# Patient Record
Sex: FEMALE | Race: ASIAN | NOT HISPANIC OR LATINO | Employment: UNEMPLOYED | URBAN - METROPOLITAN AREA
[De-identification: names, ages, dates, MRNs, and addresses within clinical notes are randomized per-mention and may not be internally consistent; named-entity substitution may affect disease eponyms.]

---

## 2024-05-10 ENCOUNTER — RESEARCH ENCOUNTER (OUTPATIENT)
Dept: RESEARCH | Facility: MEDICAL CENTER | Age: 49
End: 2024-05-10

## 2024-05-10 ENCOUNTER — OFFICE VISIT (OUTPATIENT)
Dept: MEDICAL GROUP | Facility: CLINIC | Age: 49
End: 2024-05-10
Payer: MEDICAID

## 2024-05-10 VITALS
DIASTOLIC BLOOD PRESSURE: 78 MMHG | TEMPERATURE: 98.1 F | BODY MASS INDEX: 25.34 KG/M2 | OXYGEN SATURATION: 96 % | SYSTOLIC BLOOD PRESSURE: 114 MMHG | HEIGHT: 63 IN | HEART RATE: 77 BPM | WEIGHT: 143 LBS

## 2024-05-10 DIAGNOSIS — F32.A ANXIETY AND DEPRESSION: ICD-10-CM

## 2024-05-10 DIAGNOSIS — Z13.9 DUE FOR SCREENING: ICD-10-CM

## 2024-05-10 DIAGNOSIS — Z91.89 AT RISK FOR BREAST CANCER: ICD-10-CM

## 2024-05-10 DIAGNOSIS — R53.82 CHRONIC FATIGUE AND MALAISE: ICD-10-CM

## 2024-05-10 DIAGNOSIS — R53.81 CHRONIC FATIGUE AND MALAISE: ICD-10-CM

## 2024-05-10 DIAGNOSIS — Z00.6 RESEARCH STUDY PATIENT: ICD-10-CM

## 2024-05-10 DIAGNOSIS — R63.5 WEIGHT GAIN: ICD-10-CM

## 2024-05-10 DIAGNOSIS — F10.91 ALCOHOL USE DISORDER IN REMISSION: ICD-10-CM

## 2024-05-10 DIAGNOSIS — Z78.9 ALCOHOL USE: ICD-10-CM

## 2024-05-10 DIAGNOSIS — J30.2 SEASONAL ALLERGIES: ICD-10-CM

## 2024-05-10 DIAGNOSIS — F41.9 ANXIETY AND DEPRESSION: ICD-10-CM

## 2024-05-10 PROCEDURE — 99203 OFFICE O/P NEW LOW 30 MIN: CPT | Mod: GE

## 2024-05-10 ASSESSMENT — PATIENT HEALTH QUESTIONNAIRE - PHQ9: CLINICAL INTERPRETATION OF PHQ2 SCORE: 3

## 2024-05-10 NOTE — PROGRESS NOTES
"Subjective:     CC:   Chief Complaint   Patient presents with    Establish Care     Pinched nerve behind ear that causes migraines, blood panel, feels weak. Lymph note swelling on neck, referral for mammogram,       HPI:   April Higuera is a 48 y.o. female who presents presents to the clinic today to establish care.  She recently moved to the state from [place].  Patient has a past medical history significant for breast cancer.  She also has a history of alcohol use disorder currently remission, seasonal allergies.  Patient presents to the clinic today with the following complaints, weight gain, fatigue malaise generalized anxiety and depression.  She states her symptoms have been ongoing now for several months to years.    Patient has a strong family history of colon cancer and breast cancer.  Does not appear as she still has ever been tested for any mutations to assess further.  She did have an oncologist in [place] however does not have an oncologist here.  She is due for her screening imaging studies.        Review of Systems   Constitutional: Positive for malaise fatigue and weight gain.  HENT: Negative for congestion.    Eyes: Negative for pain.   Respiratory: Negative for cough and shortness of breath.    Cardiovascular: Negative for chest pain and leg swelling.   Gastrointestinal: Negative for nausea, vomiting, abdominal pain and diarrhea.   Genitourinary: Negative for dysuria and hematuria.   Skin: Negative for rash.   Neurological: Negative for dizziness, focal weakness and headaches.   Endo/Heme/Allergies: Does not bruise/bleed easily.   Psychiatric/Behavioral: Positive for anxiety and depression.    Objective:   /78   Pulse 77   Temp 36.7 °C (98.1 °F) (Temporal)   Ht 1.6 m (5' 3\")   Wt 64.9 kg (143 lb)   SpO2 96%   BMI 25.33 kg/m²     Wt Readings from Last 4 Encounters:   05/10/24 64.9 kg (143 lb)       Physical Exam:  Constitutional: Well-developed and well-nourished. Not diaphoretic. No distress. "   Skin: Skin is warm and dry. No rash noted.  Head: Atraumatic without lesions.  Eyes: No scleral icterus.   Ears:  External ears unremarkable.   Nose: Nares patent. Septum midline. Turbinates without erythema nor edema. No discharge.   Mouth/Throat: Tongue normal. Oropharynx is clear and moist. Posterior pharynx without erythema or exudates.  Neck: Supple, trachea midline. Normal range of motion.   Cardiovascular: Regular rate and rhythm, S1 and S2 without murmur, rubs, or gallops.    Respiratory: Effort normal. Clear to auscultation throughout. No adventitious sounds.   Extremities: No cyanosis, clubbing, erythema, nor edema. Distal pulses intact and symmetric.   Musculoskeletal: All major joints AROM full in all directions without pain.  Neurological: Alert and oriented x 3. Grossly non-focal.   Psychiatric:  Behavior, mood, and affect are appropriate.    Assessment and Plan:     48-year-old female presenting to establish care with a significant past medical history of malignancy, not currently followed by oncology due to recent move out of state, presenting with significant anxiety depression chronic fatigue malaise and weight gain concerning for possible thyroid disorder versus psychiatric a daily allergy.  Patient screened for homicidal ideation which is negative.  Placed referral to psychology patient denies need for medication at this time.    Patient congratulated on her efforts to be in remission from alcohol use disorder.    1. At risk for breast cancer  - Referral to Genetic Research Studies  - Referral to Genetics  - BRCA1 and BRCA2 Sequencing; Future  - MA-DIAGNOSTIC MAMMO BILAT W/TOMOSYNTHESIS W/CAD; Future  - Referral to Hematology Oncology    2. Alcohol use    3. Seasonal allergies    4. Due for screening  - CBC WITH DIFFERENTIAL; Future  - Comp Metabolic Panel; Future  - Lipid Profile; Future  - HEMOGLOBIN A1C; Future  - HEP C VIRUS ANTIBODY; Future  - HIV AG/AB COMBO ASSAY SCREENING; Future    5.  Alcohol use disorder in remission    6. Chronic fatigue and malaise  - TSH WITH REFLEX TO FT4; Future    7. Weight gain  - TSH WITH REFLEX TO FT4; Future    8. Anxiety and depression  - Referral to Psychology      Health maintenance:  Due for screening lab, imaging    Labs per orders  Immunizations per orders  Patient counseled about skin care, diet, supplements, and exercise.  Discussed  breast self exam, mammography screening, STD prevention, feminine hygiene, family planning choices, osteoporosis, adequate intake of calcium and vitamin D, diet and exercise, colorectal cancer screening     Follow-up: 1 month

## 2024-05-10 NOTE — RESEARCH NOTE
Patient has been referred by Mica Diaz. Sent initial referral follow-up message with instructions to locate and sign consent form(s). The following consent form(s) have been pushed to the patient's MyChart: [facility]

## 2024-05-14 NOTE — RESEARCH NOTE
Confirmed with the participant which designated provider they would like study results shared with (Mica Diaz). Patient will have an opportunity to share the results with any providers of their choosing in the future by accessing their results from [employer].

## 2024-05-23 ENCOUNTER — HOSPITAL ENCOUNTER (OUTPATIENT)
Dept: LAB | Facility: MEDICAL CENTER | Age: 49
End: 2024-05-23
Payer: MEDICAID

## 2024-05-23 ENCOUNTER — HOSPITAL ENCOUNTER (OUTPATIENT)
Dept: LAB | Facility: MEDICAL CENTER | Age: 49
End: 2024-05-23

## 2024-05-23 DIAGNOSIS — R63.5 WEIGHT GAIN: ICD-10-CM

## 2024-05-23 DIAGNOSIS — Z00.6 RESEARCH STUDY PATIENT: ICD-10-CM

## 2024-05-23 DIAGNOSIS — R53.82 CHRONIC FATIGUE AND MALAISE: ICD-10-CM

## 2024-05-23 DIAGNOSIS — R53.81 CHRONIC FATIGUE AND MALAISE: ICD-10-CM

## 2024-05-23 DIAGNOSIS — Z91.89 AT RISK FOR BREAST CANCER: ICD-10-CM

## 2024-05-23 DIAGNOSIS — Z13.9 DUE FOR SCREENING: ICD-10-CM

## 2024-05-23 LAB
ALBUMIN SERPL BCP-MCNC: 4.3 G/DL (ref 3.2–4.9)
ALBUMIN/GLOB SERPL: 1.3 G/DL
ALP SERPL-CCNC: 55 U/L (ref 30–99)
ALT SERPL-CCNC: 36 U/L (ref 2–50)
ANION GAP SERPL CALC-SCNC: 14 MMOL/L (ref 7–16)
ANISOCYTOSIS BLD QL SMEAR: ABNORMAL
AST SERPL-CCNC: 38 U/L (ref 12–45)
BASOPHILS # BLD AUTO: 0.9 % (ref 0–1.8)
BASOPHILS # BLD: 0.05 K/UL (ref 0–0.12)
BILIRUB SERPL-MCNC: 0.5 MG/DL (ref 0.1–1.5)
BUN SERPL-MCNC: 11 MG/DL (ref 8–22)
CALCIUM ALBUM COR SERPL-MCNC: 9.8 MG/DL (ref 8.5–10.5)
CALCIUM SERPL-MCNC: 10 MG/DL (ref 8.5–10.5)
CHLORIDE SERPL-SCNC: 101 MMOL/L (ref 96–112)
CHOLEST SERPL-MCNC: 222 MG/DL (ref 100–199)
CO2 SERPL-SCNC: 22 MMOL/L (ref 20–33)
COMMENT NL1176: NORMAL
CREAT SERPL-MCNC: 0.54 MG/DL (ref 0.5–1.4)
EOSINOPHIL # BLD AUTO: 0.19 K/UL (ref 0–0.51)
EOSINOPHIL NFR BLD: 3.5 % (ref 0–6.9)
ERYTHROCYTE [DISTWIDTH] IN BLOOD BY AUTOMATED COUNT: 37.4 FL (ref 35.9–50)
EST. AVERAGE GLUCOSE BLD GHB EST-MCNC: 85 MG/DL
GFR SERPLBLD CREATININE-BSD FMLA CKD-EPI: 113 ML/MIN/1.73 M 2
GLOBULIN SER CALC-MCNC: 3.2 G/DL (ref 1.9–3.5)
GLUCOSE SERPL-MCNC: 94 MG/DL (ref 65–99)
HBA1C MFR BLD: 4.6 % (ref 4–5.6)
HCT VFR BLD AUTO: 38.7 % (ref 37–47)
HCV AB SER QL: NORMAL
HDLC SERPL-MCNC: 49 MG/DL
HGB BLD-MCNC: 12.9 G/DL (ref 12–16)
HIV 1+2 AB+HIV1 P24 AG SERPL QL IA: NORMAL
LDLC SERPL CALC-MCNC: 154 MG/DL
LYMPHOCYTES # BLD AUTO: 2.53 K/UL (ref 1–4.8)
LYMPHOCYTES NFR BLD: 46.9 % (ref 22–41)
MACROCYTES BLD QL SMEAR: ABNORMAL
MANUAL DIFF BLD: NORMAL
MCH RBC QN AUTO: 23.2 PG (ref 27–33)
MCHC RBC AUTO-ENTMCNC: 33.3 G/DL (ref 32.2–35.5)
MCV RBC AUTO: 69.5 FL (ref 81.4–97.8)
METAMYELOCYTES NFR BLD MANUAL: 6.9 %
MONOCYTES # BLD AUTO: 0.47 K/UL (ref 0–0.85)
MONOCYTES NFR BLD AUTO: 8.7 % (ref 0–13.4)
MORPHOLOGY BLD-IMP: NORMAL
NEUTROPHILS # BLD AUTO: 1.79 K/UL (ref 1.82–7.42)
NEUTROPHILS NFR BLD: 23.5 % (ref 44–72)
NEUTS BAND NFR BLD MANUAL: 9.6 % (ref 0–10)
NRBC # BLD AUTO: 0 K/UL
NRBC BLD-RTO: 0 /100 WBC (ref 0–0.2)
OVALOCYTES BLD QL SMEAR: NORMAL
PLATELET # BLD AUTO: 207 K/UL (ref 164–446)
PLATELET BLD QL SMEAR: NORMAL
POIKILOCYTOSIS BLD QL SMEAR: NORMAL
POTASSIUM SERPL-SCNC: 4.1 MMOL/L (ref 3.6–5.5)
PROT SERPL-MCNC: 7.5 G/DL (ref 6–8.2)
RBC # BLD AUTO: 5.57 M/UL (ref 4.2–5.4)
RBC BLD AUTO: PRESENT
SODIUM SERPL-SCNC: 137 MMOL/L (ref 135–145)
TARGETS BLD QL SMEAR: NORMAL
TRIGL SERPL-MCNC: 97 MG/DL (ref 0–149)
TSH SERPL DL<=0.005 MIU/L-ACNC: 1.68 UIU/ML (ref 0.38–5.33)
WBC # BLD AUTO: 5.4 K/UL (ref 4.8–10.8)

## 2024-05-30 ENCOUNTER — NON-PROVIDER VISIT (OUTPATIENT)
Dept: GENETICS | Facility: MEDICAL CENTER | Age: 49
End: 2024-05-30
Payer: MEDICAID

## 2024-05-30 NOTE — PROGRESS NOTES
April Higuera is a 48 y.o. female here for a non-provider visit for: Lab Draws  on 5/30/2024 at 9:20 AM    Procedure Performed: Venipuncture     Anatomical site: Right Antecubital Area (AC)    Equipment used: 25 butterfly    Labs drawn: [employer] (two lavender EDTA tubes)    Ordering Provider: NCLC    Judah By: Lisa Wellington, Med Ass't

## 2024-05-31 LAB
BRCA1+BRCA2 DEL+DUP + FULL MUT ANL BLD/T: NEGATIVE
SPECIMEN SOURCE L312082: NORMAL

## 2024-06-12 LAB
APOB+LDLR+PCSK9 GENE MUT ANL BLD/T: NOT DETECTED
BRCA1+BRCA2 DEL+DUP + FULL MUT ANL BLD/T: NOT DETECTED
MLH1+MSH2+MSH6+PMS2 GN DEL+DUP+FUL M: NOT DETECTED

## 2024-06-14 ENCOUNTER — APPOINTMENT (OUTPATIENT)
Dept: RADIOLOGY | Facility: MEDICAL CENTER | Age: 49
End: 2024-06-14
Payer: MEDICAID

## 2024-06-19 ENCOUNTER — HOSPITAL ENCOUNTER (OUTPATIENT)
Dept: RADIOLOGY | Facility: MEDICAL CENTER | Age: 49
End: 2024-06-19
Payer: MEDICAID

## 2024-06-28 ENCOUNTER — HOSPITAL ENCOUNTER (OUTPATIENT)
Dept: RADIOLOGY | Facility: MEDICAL CENTER | Age: 49
End: 2024-06-28
Payer: MEDICAID

## 2024-06-28 ENCOUNTER — TELEPHONE (OUTPATIENT)
Dept: MEDICAL GROUP | Facility: CLINIC | Age: 49
End: 2024-06-28
Payer: MEDICAID

## 2024-06-28 DIAGNOSIS — Z91.89 AT RISK FOR BREAST CANCER: ICD-10-CM

## 2024-07-02 DIAGNOSIS — Z91.89 AT RISK FOR BREAST CANCER: ICD-10-CM

## 2024-07-05 ENCOUNTER — HOSPITAL ENCOUNTER (OUTPATIENT)
Dept: RADIOLOGY | Facility: MEDICAL CENTER | Age: 49
End: 2024-07-05
Payer: MEDICAID

## 2024-07-05 DIAGNOSIS — Z91.89 AT RISK FOR BREAST CANCER: ICD-10-CM

## 2024-07-05 PROCEDURE — 77063 BREAST TOMOSYNTHESIS BI: CPT

## 2024-07-25 ENCOUNTER — HOSPITAL ENCOUNTER (OUTPATIENT)
Dept: HEMATOLOGY ONCOLOGY | Facility: MEDICAL CENTER | Age: 49
End: 2024-07-25
Attending: INTERNAL MEDICINE
Payer: MEDICAID

## 2024-07-25 VITALS
TEMPERATURE: 97.6 F | WEIGHT: 138.12 LBS | HEIGHT: 63 IN | DIASTOLIC BLOOD PRESSURE: 68 MMHG | OXYGEN SATURATION: 98 % | SYSTOLIC BLOOD PRESSURE: 108 MMHG | HEART RATE: 85 BPM | BODY MASS INDEX: 24.47 KG/M2

## 2024-07-25 DIAGNOSIS — Z85.3 HISTORY OF BREAST CANCER IN FEMALE: ICD-10-CM

## 2024-07-25 PROCEDURE — 99212 OFFICE O/P EST SF 10 MIN: CPT | Performed by: INTERNAL MEDICINE

## 2024-07-25 ASSESSMENT — PAIN SCALES - GENERAL: PAINLEVEL: NO PAIN

## 2024-07-25 ASSESSMENT — ENCOUNTER SYMPTOMS
CARDIOVASCULAR NEGATIVE: 1
RESPIRATORY NEGATIVE: 1
CONSTITUTIONAL NEGATIVE: 1
GASTROINTESTINAL NEGATIVE: 1
MUSCULOSKELETAL NEGATIVE: 1
NEUROLOGICAL NEGATIVE: 1
EYES NEGATIVE: 1
PSYCHIATRIC NEGATIVE: 1

## 2024-07-25 ASSESSMENT — FIBROSIS 4 INDEX: FIB4 SCORE: 1.5

## 2024-10-10 ENCOUNTER — HOSPITAL ENCOUNTER (OUTPATIENT)
Dept: RADIOLOGY | Facility: MEDICAL CENTER | Age: 49
End: 2024-10-10
Attending: INTERNAL MEDICINE
Payer: MEDICAID

## 2024-10-10 DIAGNOSIS — Z85.3 HISTORY OF BREAST CANCER IN FEMALE: ICD-10-CM

## 2024-10-10 PROCEDURE — 700117 HCHG RX CONTRAST REV CODE 255: Mod: JZ,UD | Performed by: INTERNAL MEDICINE

## 2024-10-10 PROCEDURE — A9579 GAD-BASE MR CONTRAST NOS,1ML: HCPCS | Mod: JZ,UD | Performed by: INTERNAL MEDICINE

## 2024-10-10 PROCEDURE — 77049 MRI BREAST C-+ W/CAD BI: CPT

## 2024-10-10 RX ADMIN — GADOTERIDOL 13 ML: 279.3 INJECTION, SOLUTION INTRAVENOUS at 13:25

## 2024-11-07 ENCOUNTER — HOSPITAL ENCOUNTER (OUTPATIENT)
Dept: RADIOLOGY | Facility: MEDICAL CENTER | Age: 49
End: 2024-11-07
Attending: INTERNAL MEDICINE
Payer: MEDICAID

## 2024-11-07 DIAGNOSIS — R92.8 ABNORMAL MRI, BREAST: ICD-10-CM

## 2024-11-07 PROCEDURE — 76642 ULTRASOUND BREAST LIMITED: CPT | Mod: RT

## 2025-04-27 ENCOUNTER — OFFICE VISIT (OUTPATIENT)
Dept: URGENT CARE | Facility: CLINIC | Age: 50
End: 2025-04-27
Payer: MEDICAID

## 2025-04-27 VITALS
SYSTOLIC BLOOD PRESSURE: 118 MMHG | HEIGHT: 63 IN | TEMPERATURE: 95.5 F | HEART RATE: 85 BPM | RESPIRATION RATE: 16 BRPM | WEIGHT: 133.6 LBS | BODY MASS INDEX: 23.67 KG/M2 | OXYGEN SATURATION: 100 % | DIASTOLIC BLOOD PRESSURE: 82 MMHG
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
## 2025-04-27 DIAGNOSIS — Z23 NEED FOR TETANUS BOOSTER: ICD-10-CM

## 2025-04-27 DIAGNOSIS — R03.0 ELEVATED BLOOD PRESSURE READING: ICD-10-CM

## 2025-04-27 DIAGNOSIS — L03.113 CELLULITIS OF RIGHT UPPER EXTREMITY: ICD-10-CM

## 2025-04-27 PROCEDURE — 90715 TDAP VACCINE 7 YRS/> IM: CPT | Performed by: NURSE PRACTITIONER

## 2025-04-27 PROCEDURE — 90471 IMMUNIZATION ADMIN: CPT | Performed by: NURSE PRACTITIONER

## 2025-04-27 PROCEDURE — 3074F SYST BP LT 130 MM HG: CPT | Performed by: NURSE PRACTITIONER

## 2025-04-27 PROCEDURE — 99214 OFFICE O/P EST MOD 30 MIN: CPT | Mod: 25 | Performed by: NURSE PRACTITIONER

## 2025-04-27 PROCEDURE — 3079F DIAST BP 80-89 MM HG: CPT | Performed by: NURSE PRACTITIONER

## 2025-04-27 ASSESSMENT — FIBROSIS 4 INDEX: FIB4 SCORE: 1.5

## 2025-04-28 NOTE — PROGRESS NOTES
"April Higuera is a 49 y.o. female who presents for Hand Injury (Right hand Dog bite yesterday, hand is swollen and pain)      HPI  This is a new problem. April Higuera is a 49 y.o. patient who presents to urgent care with c/o: She was  her dog from her friends dog who were playing and then fighting over a toy.  Both dogs are fully vaccinated. She was bit on the right hand - uncertain which dog actually bit her hand. Hand is very sore and tender. Swollen and red. She is right hand dominant.   Tx tried: washed with soap and water and iodine after the bite. Ice and a bandage stocking wrap.   Tdap: in [place] - approx 7-10 years ago.   Neg hx diabetes. No blood thinners.   Pt reports that she is very nervous.     ROS See HPI    Allergies:     Not on File    PMSFS Hx:  Past Medical History:   Diagnosis Date    Breast cancer (HCC)      History reviewed. No pertinent surgical history.  History reviewed. No pertinent family history.  Social History     Tobacco Use    Smoking status: Some Days     Current packs/day: 1.00     Types: Cigarettes     Passive exposure: Never    Smokeless tobacco: Not on file    Tobacco comments:     1 pack a week   Substance Use Topics    Alcohol use: Yes     Alcohol/week: 1.2 oz     Types: 2 Glasses of wine per week     Comment: 2 glasses everday         Problems:   Patient Active Problem List   Diagnosis    History of breast cancer in female       Medications:   No current outpatient medications on file prior to visit.     No current facility-administered medications on file prior to visit.        Objective:     BP (!) 144/102 (BP Location: Left arm, Patient Position: Sitting, BP Cuff Size: Adult)   Pulse 90   Temp (!) 35.4 °C (95.7 °F) (Temporal)   Resp 16   Ht 1.6 m (5' 3\")   Wt 60.6 kg (133 lb 9.6 oz)   SpO2 100%   BMI 23.67 kg/m²     Physical Exam  Vitals and nursing note reviewed.   Constitutional:       Appearance: Normal appearance.   Cardiovascular:      Rate and Rhythm: Normal " rate.      Pulses: Normal pulses.   Pulmonary:      Effort: Pulmonary effort is normal.   Musculoskeletal:      Right hand: Swelling, laceration (single puncture wound to right hand with surrounding and advancing erythema that is well demarkated.) and tenderness present.        Arms:         Hands:    Skin:     Capillary Refill: Capillary refill takes less than 2 seconds.      Findings: Erythema present.   Neurological:      Mental Status: She is alert and oriented to person, place, and time.         Assessment /Associated Orders:      1. Elevated blood pressure reading        2. Dog bite, initial encounter  amoxicillin-clavulanate (AUGMENTIN) 875-125 MG Tab      3. Cellulitis of right upper extremity  amoxicillin-clavulanate (AUGMENTIN) 875-125 MG Tab      4. Need for tetanus booster  Tdap =>8yo IM            Medical Decision Making:    April Higuera is a very pleasant 49 y.o. female who is clinically stable at today's acute urgent care visit. Presents with acute problem/ concern today.    No acute distress is noted at the time of the visit.  VSS. Appropriate for outpatient care at this time.   Single provoked bite to right hand.    Tdap indicated and given today.   Educated in proper administration of  prescription medication(s) ordered today including safety, possible SE, risks, benefits, rationale and alternatives to therapy.   OTC Epsom salt and warm water soaks twice daily to 3 times daily to promote healing  OTC  analgesic of choice (acetaminophen or NSAID) prn pain. Follow manufactures dosing and safety precautions.   Ice pack prn pain   Elevation of extremity prn swelling/ pain.    Educated in on going wound care at home. The patient is alerted to watch for any signs of infection (redness, pus, pain, increased swelling or fever) and call if such occurs.   Do not put ointment over puncture wound.     Referred to  primary care provider for monitoring and management. Educated in end organ effects of uncontrolled BP   including MI, CVA, Blindness, CRF and death. Educated in TLC's.  Advised close monitoring.     Through shared decision making a discussion of the Dx and DDx, management options (risks,benefits, and alternatives to planned treatment), natural progression, supportive care and indications for immediate follow-up discussed. Expressed understanding and the treatment plan was agreed upon.    Questions were encouraged and answered   [facility] dog bite/ incident form was filled out by pt.   Follow Up:   Return to urgent care prn if new or worsening sx or if there is no improvement in condition prn.    Educated in Red flags and indications to immediately call 911 or present to the Emergency Department.         Please note that this dictation was created using voice recognition software. I have worked with consultants from the vendor as well as technical experts from [facility] [employer] to optimize the interface. I have made every reasonable attempt to correct obvious errors, but I expect that there are errors of grammar and possibly content that I did not discover before finalizing the note.  This note was electronically signed by provider

## 2025-07-29 ENCOUNTER — HOSPITAL ENCOUNTER (OUTPATIENT)
Facility: MEDICAL CENTER | Age: 50
End: 2025-07-29
Attending: INTERNAL MEDICINE
Payer: MEDICAID

## 2025-07-29 VITALS
WEIGHT: 127.4 LBS | OXYGEN SATURATION: 100 % | HEIGHT: 62 IN | HEART RATE: 75 BPM | SYSTOLIC BLOOD PRESSURE: 104 MMHG | DIASTOLIC BLOOD PRESSURE: 68 MMHG | BODY MASS INDEX: 23.45 KG/M2 | TEMPERATURE: 97.9 F

## 2025-07-29 DIAGNOSIS — Z85.3 HISTORY OF BREAST CANCER IN FEMALE: Primary | ICD-10-CM

## 2025-07-29 PROCEDURE — 99212 OFFICE O/P EST SF 10 MIN: CPT | Performed by: INTERNAL MEDICINE

## 2025-07-29 ASSESSMENT — ENCOUNTER SYMPTOMS
EYES NEGATIVE: 1
CARDIOVASCULAR NEGATIVE: 1
CONSTITUTIONAL NEGATIVE: 1
GASTROINTESTINAL NEGATIVE: 1
PSYCHIATRIC NEGATIVE: 1
NEUROLOGICAL NEGATIVE: 1
MUSCULOSKELETAL NEGATIVE: 1
RESPIRATORY NEGATIVE: 1

## 2025-07-29 ASSESSMENT — PAIN SCALES - GENERAL: PAINLEVEL_OUTOF10: NO PAIN

## 2025-07-29 ASSESSMENT — FIBROSIS 4 INDEX: FIB4 SCORE: 1.53

## 2025-07-29 NOTE — PROGRESS NOTES
Medical oncology follow-up visit: 7/29/2025      Referring Physician: Mica Diaz MD  Primary Care:  Mandie Childers M.D.    Diagnosis: History of left breast cancer    Chief Complaint: History of left breast cancer to establish oncologic care    History of Presenting Illness:  April Higuera is a 49 y.o. premenopausal  female who self detected a mass in her upper outer quadrant of the left breast in 2018.  She was in [place] at the time and received all of her treatment there.  She had biopsies of the left breast which established breast cancer in the right breast for benign lesion in the upper inner quadrant which left her with a keloid scar.  She underwent a left partial mastectomy and sentinel lymph node biopsy followed by radiation therapy.  She does not recall all the details of her tumor including size but her lymph nodes were negative and it was hormone receptor positive.  She had a brief course of tamoxifen but could not tolerate it and discontinued after few weeks.  She has been on observation only since that time.  She recently moved to [place] and established primary care and is establishing oncologic care now.  She has no symptoms referable to metastatic disease.  She had a mammogram at [facility] on 7/5/2024 which showed post treatment changes in the upper central left breast and axilla.  The breast parenchyma was extremely dense.  In the past she has had ultrasounds along with her mammograms.  She recently underwent genetic testing for a 34 gene panel that was completely negative for pathogenic variants.  She has a family history of breast and colon cancer.  She is premenopausal with normal cycle lengths and no hot flashes or night sweats.    Interval history 7/29/2025: She is having perimenopausal symptoms now with irregular periods and some mood swings.  She has no significant hot flashes or night sweats.  We advised her against HRT but she will talk with her gynecologist about other symptomatic  "obdulia.  MRI in October 2024 showed extremely dense breasts and fibroadenoma.  We will continue with yearly MRIs because of the density.  She has noted some subcutaneous nodules left axilla.  She is also interested in having her left breast implanted so it matches better with the right breast.  She might consider taking her keloid off at that time.  Otherwise no new symptoms referable to metastatic disease.      Past Medical History:   Diagnosis Date   • Breast cancer (HCC)        History reviewed. No pertinent surgical history.    Social History     Tobacco Use   • Smoking status: Some Days     Current packs/day: 1.00     Types: Cigarettes     Passive exposure: Never   • Tobacco comments:     1 pack a week   Vaping Use   • Vaping status: Some Days   Substance Use Topics   • Alcohol use: Yes     Alcohol/week: 1.2 oz     Types: 2 Glasses of wine per week     Comment: 2 glasses everday   • Drug use: Never        History reviewed. No pertinent family history.    Allergies as of 07/29/2025   • (No Known Allergies)         Current Outpatient Medications:   •  VITAMIN D PO, Take  by mouth., Disp: , Rfl:   •  Omega-3 Fatty Acids (FISH OIL PO), Take  by mouth., Disp: , Rfl:   •  B Complex Vitamins (B COMPLEX PO), Take  by mouth., Disp: , Rfl:   •  NON SPECIFIED, Ashwaganda PO, Disp: , Rfl:     Review of Systems:  Review of Systems   Constitutional: Negative.    HENT: Negative.     Eyes: Negative.    Respiratory: Negative.     Cardiovascular: Negative.    Gastrointestinal: Negative.    Genitourinary: Negative.    Musculoskeletal: Negative.    Skin: Negative.    Neurological: Negative.    Endo/Heme/Allergies: Negative.    Psychiatric/Behavioral: Negative.            Physical Exam:  Vitals:    07/29/25 1139   BP: 104/68   BP Location: Right arm   Patient Position: Sitting   Pulse: 75   Temp: 36.6 °C (97.9 °F)   TempSrc: Temporal   SpO2: 100%   Weight: 57.8 kg (127 lb 6.4 oz)   Height: 1.575 m (5' 2\")       DESC; KARNOFSKY " SCALE WITH ECOG EQUIVALENT: 100, Fully active, able to carry on all pre-disease performed without restriction (ECOG equivalent 0)    DISTRESS LEVEL: no acute distress    Physical Exam  Constitutional:       Appearance: Normal appearance.   HENT:      Head: Normocephalic.      Mouth/Throat:      Mouth: Mucous membranes are moist.      Pharynx: Oropharynx is clear.   Eyes:      Extraocular Movements: Extraocular movements intact.      Conjunctiva/sclera: Conjunctivae normal.      Pupils: Pupils are equal, round, and reactive to light.   Cardiovascular:      Rate and Rhythm: Normal rate and regular rhythm.      Pulses: Normal pulses.   Pulmonary:      Effort: Pulmonary effort is normal.      Breath sounds: Normal breath sounds.   Chest:      Comments: Left breast shows a upper outer quadrant lumpectomy scar without nodularity.  Right breast shows a upper inner quadrant from previous biopsy.  Both breasts are without masses nipple discharge or tenderness.  No axillary adenopathy noted bilaterally.  Abdominal:      General: Abdomen is flat. Bowel sounds are normal.      Palpations: Abdomen is soft. There is no mass.   Musculoskeletal:         General: Normal range of motion.   Skin:     General: Skin is warm.   Neurological:      General: No focal deficit present.      Mental Status: She is alert and oriented to person, place, and time.   Psychiatric:         Mood and Affect: Mood normal.         Behavior: Behavior normal.          Labs:  No visits with results within 1 Month(s) from this visit.   Latest known visit with results is:   Hospital Outpatient Visit on 05/23/2024   Component Date Value Ref Range Status   • MLH1+MSH2+MSH6+PMS2+EPCAM Gene Del* 05/23/2024 Not Detected   Final    Comment: No clinically relevant variants were identified.  The genes tested for this condition were MLH1, MSH2, MSH6, PMS2, EPCAM.  No clinically relevant findings to report.  These results must be interpreted in the context of this  individual's clinical profile and family history. If there is a clinical indication for additional testing, such as a personal or family history consistent with a genetic disorder, genetic counseling and/or physician consultation is strongly recommended. Genes not included in this analysis, as well as environmental and other nongenetic factors, can also influence disease risk.  This panel does not test for all causes of disease. Genes not included in this analysis, as well as environmental and other nongenetic factors, can also influence disease risk. Additional testing could be warranted depending on your personal and family history. Please consult with a qualified medical professional to discuss the appropriateness of additional testing.Sequence analysis cannot de                           termine if multiple variants are on the same (in cis) or opposite (in trans) copies of a gene. For compound heterozygous cases, targeted testing of appropriate family members is recommended to confirm that the variants are on opposite chromosomes.  Genes tested: APOB,BRCA1,BRCA2,EPCAM,LDLR,LDLRAP1,MLH1,MSH2,MSH6,PCSK9,PMS2  Sequencing done at: [facility] [employer] [address] [place] [address], [address], [place] [address]. ([facility]# [id number])  approvals: Dolores Boone, PhD, [facility] - Clinical , [facility]  email: [email or web address]     • BRCA1+BRCA2 Gene Deletion+Dup and * 05/23/2024 Not Detected   Final    Comment: No clinically relevant variants were identified.  The genes tested for this condition were BRCA1, BRCA2.  No clinically relevant findings to report.  These results must be interpreted in the context of this individual's clinical profile and family history. If there is a clinical indication for additional testing, such as a personal or family history consistent with a genetic disorder, genetic counseling and/or physician consultation is strongly recommended. Genes not included in  this analysis, as well as environmental and other nongenetic factors, can also influence disease risk.  This panel does not test for all causes of disease. Genes not included in this analysis, as well as environmental and other nongenetic factors, can also influence disease risk. Additional testing could be warranted depending on your personal and family history. Please consult with a qualified medical professional to discuss the appropriateness of additional testing.Sequence analysis cannot determine if multip                           le variants are on the same (in cis) or opposite (in trans) copies of a gene. For compound heterozygous cases, targeted testing of appropriate family members is recommended to confirm that the variants are on opposite chromosomes.  Genes tested: APOB,BRCA1,BRCA2,EPCAM,LDLR,LDLRAP1,MLH1,MSH2,MSH6,PCSK9,PMS2  Sequencing done at: [facility] [address] [place] [address], [address], [place] [address]. ([place]# [id number])  approvals: Dolores Boone, PhD, [facility] - Clinical , [facility]  email: [email or web address]     • APOB+LDLR+LDRAP1+PCSK9 Gene Deleti* 05/23/2024 Not Detected   Final    Comment: No clinically relevant variants were identified.  The genes tested for this condition were APOB, LDLR, LDLRAP1, PCSK9.  No clinically relevant findings to report.  These results must be interpreted in the context of this individual's clinical profile and family history. If there is a clinical indication for additional testing, such as a personal or family history consistent with a genetic disorder, genetic counseling and/or physician consultation is strongly recommended. Genes not included in this analysis, as well as environmental and other nongenetic factors, can also influence disease risk.  This panel does not test for all causes of disease. Genes not included in this analysis, as well as environmental and other nongenetic factors, can also  influence disease risk. Additional testing could be warranted depending on your personal and family history. Please consult with a qualified medical professional to discuss the appropriateness of additional testing.Sequence analysis cannot deter                           mine if multiple variants are on the same (in cis) or opposite (in trans) copies of a gene. For compound heterozygous cases, targeted testing of appropriate family members is recommended to confirm that the variants are on opposite chromosomes.  Genes tested: APOB,BRCA1,BRCA2,EPCAM,LDLR,LDLRAP1,MLH1,MSH2,MSH6,PCSK9,PMS2  Methods and Limitations: The test panel is bioinformatically selected from the [employer]+, performed on genomic DNA extracted from your submitted saliva sample. DNA from your sample was captured and enriched using a custom set of reagents. The targeted regions were then sequenced using an Illumina sequencing system. Alignment to a modified version of GRCh38 and variant calling were completed using a customized version of [employer] software, requiring 20x coverage for validated variant calls. Copy Number Variants (CNVs) were called using a proprietary bioinformatics pipeline that compared the coverage profile of your sample with the coverage profiles of a refe                           rence set of other samples. [employer] ([place] [address]; CLIA ID: [id number], CAP: [id number], [place] PFI: [id number]; Kaelyn Ireland, PhD, [facility], )pulled variant data for the exons and 10 bp of flanking intronic sequence (and select tagged intronic variants) of the 11 genes included in [employer] from the [employer] Secure Database. [employer] has developed a bioinformatics analysis pipeline to compare sequencing variants in the individual being tested to the reference sequence. High-quality variants which pass [employer]' quality filters are not confirmed by [facility] sequencing. CNV analysis is  restricted to only the exons of genes within this panel and must be confirmed independently. Most CNVs encompassing one or more exons are detectable; however, CNV detection sensitivity diminishes with smaller CNV size and not all CNVs may be detected. CNV breakpoints are approximate; actual breakpoint locations may lie outside the interrogated genom                           ic regions. Interpretations are based solely on exons affected by the CNV.Any variants identified including SNPs, indels and CNVs should be confirmed. Confirmatory testing of the variants identified is not included in this analysis. Possible diagnostic errors include sample mix-ups, genetic variants that interfere with analysis, and other sources. The Analytical Range includes single nucleotide variants (SNVs), indels up to 20 bp in length, and CNVs. Some known complex variants like inversion exon 1-7 in the MSH2 gene (Kash inversion) or exons 11-15 of the PMS2 gene are not analyzed or reported. For genes involved in Familial Hypercholesterolemia, only variants associated with the condition are reported, while variants associated with other phenotypes such as hypobetalipoproteinemia are not included.  Sequencing done at: [facility], [address] [place] [address], [address], [place] [address]. ([facility]# [id number])  approvals: Dolores Boone, PhD, [facility] - Clinical ,                            [email or web address]  email: [email or web address]         Imaging:   All listed images below have been independently reviewed by me. I agree with the findings as summarized below:    MA-SCREENING MAMMO BILAT W/TOMOSYNTHESIS W/CAD    Result Date: 7/8/2024 7/5/2024 2:31 PM HISTORY/REASON FOR EXAM:  Routine Mammographic Screening. History of left breast cancer with lumpectomy. History of previous right breast benign biopsy for fibroadenoma and complicated cysts in the right breast. History of a right upper inner breast keloid.  TECHNIQUE/EXAM DESCRIPTION: Bilateral tomosynthesis screening mammography was performed with standard mammographic images generated from the data set. Images were reviewed and interpreted with CAD. COMPARISON:   2/11/2021, 2/21/2020 FINDINGS: The breast parenchyma is extremely dense which limits the sensitivity of mammography. There are posttreatment changes in the upper central left breast and axilla. There is postbiopsy change in the right medial central breast. There are a few benign calcifications noted.     1.  Breasts are extremely dense, which lowers sensitivity of mammography. No gross evidence of malignancy. 2.  Screening mammogram in one year is recommended. R2 - CATEGORY 2: BENIGN FINDING(S) Ten to twenty percent of all cancers can be categorized as hereditary and the clinical and financial value of identifying patients and families at risk is well documented. If you have a personal or family history of breast, ovarian, fallopian tube, peritoneal or other cancer, please consult your physician regarding genetic counseling and testing.       Pathology:      Assessment & Plan:  1.  Invasive left breast cancer status post partial mastectomy and radiation therapy.  It was hormone receptor positive.  She tried tamoxifen for few weeks and could not tolerate it and has received no further endocrine therapy and is on observation.  2.  Extremely dense breasts-continue yearly MRI  3.  Keloid formation on the right breast after benign biopsy  4.  Sebaceous cyst in left axilla superficially    Plan: Will obtain a baseline MRI because of her extremely dense breast.  We will see her back in 1 year for routine follow-up and continue at that interval.           Any questions and concerns raised by the patient were answered to the best of my ability. Thank you for allowing me to participate in the care for this patient. Please feel free to contact me for any questions or concerns.     Kit Duffy M.D.

## 2025-07-29 NOTE — ADDENDUM NOTE
Encounter addended by: Manolo Blackwell, Med Ass't on: 7/29/2025 1:19 PM   Actions taken: Charge Capture section accepted

## 2025-10-24 ENCOUNTER — APPOINTMENT (OUTPATIENT)
Dept: MEDICAL GROUP | Facility: MEDICAL CENTER | Age: 50
End: 2025-10-24
Payer: MEDICAID